# Patient Record
Sex: FEMALE | Race: WHITE | HISPANIC OR LATINO | Employment: FULL TIME | URBAN - METROPOLITAN AREA
[De-identification: names, ages, dates, MRNs, and addresses within clinical notes are randomized per-mention and may not be internally consistent; named-entity substitution may affect disease eponyms.]

---

## 2023-05-10 ENCOUNTER — TRANSFERRED RECORDS (OUTPATIENT)
Dept: HEALTH INFORMATION MANAGEMENT | Facility: CLINIC | Age: 23
End: 2023-05-10

## 2024-08-05 ENCOUNTER — TRANSFERRED RECORDS (OUTPATIENT)
Dept: HEALTH INFORMATION MANAGEMENT | Facility: CLINIC | Age: 24
End: 2024-08-05
Payer: COMMERCIAL

## 2024-08-14 ENCOUNTER — OFFICE VISIT (OUTPATIENT)
Dept: FAMILY MEDICINE | Facility: CLINIC | Age: 24
End: 2024-08-14
Payer: COMMERCIAL

## 2024-08-14 VITALS
TEMPERATURE: 98.1 F | SYSTOLIC BLOOD PRESSURE: 118 MMHG | HEART RATE: 73 BPM | RESPIRATION RATE: 18 BRPM | BODY MASS INDEX: 26.62 KG/M2 | DIASTOLIC BLOOD PRESSURE: 78 MMHG | HEIGHT: 61 IN | OXYGEN SATURATION: 97 % | WEIGHT: 141 LBS

## 2024-08-14 DIAGNOSIS — Z76.89 ENCOUNTER TO ESTABLISH CARE: Primary | ICD-10-CM

## 2024-08-14 DIAGNOSIS — Z30.09 ENCOUNTER FOR OTHER GENERAL COUNSELING OR ADVICE ON CONTRACEPTION: ICD-10-CM

## 2024-08-14 PROCEDURE — 99203 OFFICE O/P NEW LOW 30 MIN: CPT | Mod: GC

## 2024-08-14 NOTE — PATIENT INSTRUCTIONS
Great to meet you today! We will request records from your prior clinic. We will plan to schedule you for a follow up procedure visit to have the Nexplanon (implant) placed.     Please let us know if you have any questions or concerns.

## 2024-08-14 NOTE — PROGRESS NOTES
I have personally reviewed the history and examination as documented by Dr. Arredondo.  I was present during key portions of the visit and agree with the assessment and plan as documented for 23 yr old female here for contraceptive mgmt. Will have her return for nexplanon insertion. Precautions given. Anticipatory guidance given.     Bhanu Van MD  August 14, 2024  3:35 PM

## 2024-08-14 NOTE — PROGRESS NOTES
"  Assessment & Plan     Encounter to establish care  Patient here today to establish care at our clinic. Was previously seen at [facility]. NEW filled out today for records. No acute concerns, wanting to discuss contraceptive options and interested in placement of Nexplanon. Reviewed past medical history. Will await records from outside clinic. Plan to schedule for procedure follow up for Nexplanon placement.       Encounter for other general counseling or advice on contraception  Patient here today to discuss contraceptive options. Had previously had Nexplanon implant, which she liked and is interested in again. Last had it removed one year ago, because at that time, patient reports \"it was placed wrong\" and she was having a lot of arm pain associated with the implant. Recently had injection of Depo (8/5/24) at outside clinic, but here today because she is wanting the implant again. Discussed risk/benefits of Nexplanon. Discussed the steps to the procedure. Because patient recently had the Depo injection, will plan to schedule follow up procedure visit in ~2 months.     BMI  Estimated body mass index is 26.62 kg/m  as calculated from the following:    Height as of this encounter: 1.55 m (5' 1.02\").    Weight as of this encounter: 64 kg (141 lb).     Return in about 2 months (around 10/14/2024) for procedure - nexplanon.    Teresita Giordano is a 23 year old, presenting for the following health issues:  Establish Care and Contraception (Wants Birth control implant )        8/14/2024     3:09 PM   Additional Questions   Roomed by Bakari   Accompanied by self         8/14/2024    Information    services provided? Yes   Language Armenian   Type of interpretation provided Face-to-face    name [facility]    Agency [employer]        HPI     23 year old here today to establish care    Seen previously at [facility]. No concerns today.     Interested in nexplanon for birth " "control.  Had depo injection last Monday, but looking for LARC option.   Had nexplanon in the past, and wants this again   No interested in children for the next 3 years. Currently sexually active.   In monogomous relationship. No concerns with partner.     No reported PMHx  No Surgical history  Not on any medication    Contraception  - interested in implant  - not interested in children now  - had nexplanon in the past - had it removed about one year ago - states it was placed 'wrong' and she had a lot of arm pain   - currently in relationship, currently sexually active, using Depo  - last depo - last Monday 8/5/24  - LMP 7/25/24     Review of Systems  Constitutional, neuro, ENT, endocrine, pulmonary, cardiac, gastrointestinal, genitourinary, musculoskeletal, integument and psychiatric systems are negative, except as otherwise noted.      Objective    /78 (BP Location: Right arm, Patient Position: Sitting)   Pulse 73   Temp 98.1  F (36.7  C) (Oral)   Resp 18   Ht 1.55 m (5' 1.02\")   Wt 64 kg (141 lb)   LMP 07/25/2024 (Exact Date)   SpO2 97%   BMI 26.62 kg/m    Body mass index is 26.62 kg/m .  Physical Exam   GENERAL: alert and no distress  NECK: no adenopathy, no asymmetry, masses, or scars  RESP: lungs clear to auscultation - no rales, rhonchi or wheezes  CV: regular rate and rhythm  ABDOMEN: soft, nontender  MS: no gross musculoskeletal defects noted, no edema          Signed Electronically by: Amalia Arredondo, DO    "

## 2024-10-09 NOTE — PROGRESS NOTES
Assessment & Plan     Nexplanon insertion  Encounter for other contraceptive management  Last saw patient to establish care 8/14/24 at which time contraceptive options were discussed. Patient previously had Nexplanon and wanted to proceed with placement of this. Prior form of contraception, Depo.  Here today for Nexplanon insertion. See procedure note below.   - etonogestrel (NEXPLANON) subdermal implant 68 mg  - lidocaine 1% with EPINEPHrine 1:100,000 injection 3 mL    Procedure Note-Nexplanon Insertion    Giuliana Combs is a patient of Amalia Herrera here for placement of etonogestrel implant (Nexplanon)    Indication:Contraception    Consent: Affirmation of informed consent was signed and scanned into the medical record. Risks, benefits and alternatives were discussed. Patient's questions were elicited and answered.   Procedure safety checklist was completed:  Yes  Time Out (Pause for the Cause) completed: Yes    Labs: UPT:  prior Depo   LMP:   7/25/24   Previous Contraception:  Depo 8/14/24                Counseling:  Pt. counseled on potential side effects of  Nexplanon, including unpredictable spotting/bleeding and plan for removal/replacement of Nexplanon in 3 years or less.    Preoperative Diagnosis: Desires effective contraception  Postoperative Diagnosis: etonogestrel implant in place  Skin prep Betadine  Anesthesia 1% lidocaine, with epi    Technique:   Patient was placed supine with left arm exposed.  Marky was made 8 cm above medial epicondial and a guiding marky 4 cm above the first.  Arm was prepped with betadine. Insertion point was anesthetized with 1% lidocaine 2mL. While stretching the skin around the insertion site,,skin punctured with the tip of the needle inserted at 30 degrees and then lowered to horizontal position. While lifting the skin with the tip of the needle, the needle was advanced to it's full length. Applicator was then stabilized and the purple slider unlocked and  "moved back completely until it stopped. Applicator was then removed.  Correct placement of the implant was confirmed by palpating it in patient's arm and visualizing the purple top of the obturator.  Insertion site was dressed with a bandaid and covered by a pressure dressing.      User card and patient chart label filled out. User card  given to patient for record. Nexplanon added to medication list     Lot# [[id number]]    EBL: minimal  Complications:  No  Tolerance:  Pt tolerated procedure well and was in stable condition.     Follow up: Pt was instructed to call if bleeding, severe pain or foul smell.     Resident: Amalia Arredondo DO  Faculty: Dr. Kolby Guajardo MD; present for and supervised this entire procedure.      BMI  Estimated body mass index is 28.69 kg/m  as calculated from the following:    Height as of this encounter: 1.54 m (5' 0.63\").    Weight as of this encounter: 68 kg (150 lb).     No follow-ups on file.    Teresita Giordano is a 23 year old, presenting for the following health issues:  Nexplanon insertion  (No concern)        10/18/2024     3:30 PM   Additional Questions   Roomed by Tamika   Accompanied by          10/18/2024    Information    services provided? Yes   Language Hebrew   Type of interpretation provided Face-to-face    name Kwesi WALSH     23-year-old here today for Nexplanon placement.  Has had this placed in the past.  During prior visits, discussed treatment options for contraceptive management.  At that time, patient had recently received Depo injection.  Here today for placement of Nexplanon.      Review of Systems  Constitutional, neuro, ENT, endocrine, pulmonary, cardiac, gastrointestinal, genitourinary, musculoskeletal, integument and psychiatric systems are negative, except as otherwise noted.      Objective    /86   Pulse 80   Temp 98.2  F (36.8  C) (Oral)   Resp 14   Ht 1.54 m (5' 0.63\")   Wt 68 kg (150 lb)  "  SpO2 97%   BMI 28.69 kg/m    Body mass index is 28.69 kg/m .  Physical Exam   GENERAL: healthy, alert and no distress  RESP: speaking in full sentences, normal work of breathing   CV: extremities well perfused  ABDOMEN: soft, nontender  MS: no gross musculoskeletal defects noted, no edema  PSYCH: mentation appears normal, affect normal/bright          Signed Electronically by: Amalia Arredondo, DO

## 2024-10-18 ENCOUNTER — OFFICE VISIT (OUTPATIENT)
Dept: FAMILY MEDICINE | Facility: CLINIC | Age: 24
End: 2024-10-18
Payer: COMMERCIAL

## 2024-10-18 VITALS
HEIGHT: 61 IN | SYSTOLIC BLOOD PRESSURE: 126 MMHG | BODY MASS INDEX: 28.32 KG/M2 | HEART RATE: 80 BPM | OXYGEN SATURATION: 97 % | TEMPERATURE: 98.2 F | RESPIRATION RATE: 14 BRPM | WEIGHT: 150 LBS | DIASTOLIC BLOOD PRESSURE: 86 MMHG

## 2024-10-18 DIAGNOSIS — Z30.8 ENCOUNTER FOR OTHER CONTRACEPTIVE MANAGEMENT: ICD-10-CM

## 2024-10-18 DIAGNOSIS — Z30.017 NEXPLANON INSERTION: Primary | ICD-10-CM

## 2024-10-18 DIAGNOSIS — Z97.5 NEXPLANON IN PLACE: ICD-10-CM

## 2024-10-18 PROCEDURE — 11981 INSERTION DRUG DLVR IMPLANT: CPT | Mod: GC

## 2024-10-18 RX ORDER — LIDOCAINE HYDROCHLORIDE AND EPINEPHRINE 10; 10 MG/ML; UG/ML
3 INJECTION, SOLUTION INFILTRATION; PERINEURAL ONCE
Status: COMPLETED | OUTPATIENT
Start: 2024-10-18 | End: 2024-10-18

## 2024-10-18 RX ADMIN — LIDOCAINE HYDROCHLORIDE AND EPINEPHRINE 3 ML: 10; 10 INJECTION, SOLUTION INFILTRATION; PERINEURAL at 16:55

## 2024-10-20 NOTE — PROGRESS NOTES
Preceptor Attestation:   Patient seen, evaluated and discussed with the resident. I was present for and supervised the entire procedure. I have verified the content of the note, which accurately reflects my assessment of the patient and the plan of care.  Supervising Physician:Kolby Guajardo MD  [facility]

## 2025-05-09 ENCOUNTER — APPOINTMENT (OUTPATIENT)
Dept: INTERPRETER SERVICES | Facility: CLINIC | Age: 25
End: 2025-05-09
Payer: COMMERCIAL

## 2025-05-15 ENCOUNTER — HOSPITAL ENCOUNTER (OUTPATIENT)
Dept: ULTRASOUND IMAGING | Facility: HOSPITAL | Age: 25
End: 2025-05-15
Attending: ADVANCED PRACTICE MIDWIFE
Payer: COMMERCIAL

## 2025-05-15 DIAGNOSIS — Z34.01 PRIMIGRAVIDA, FIRST TRIMESTER: ICD-10-CM

## 2025-05-15 DIAGNOSIS — Z34.02 FIRST PREGNANCY, SECOND TRIMESTER: ICD-10-CM

## 2025-05-15 PROCEDURE — 76801 OB US < 14 WKS SINGLE FETUS: CPT

## 2025-05-15 PROCEDURE — T1013 SIGN LANG/ORAL INTERPRETER: HCPCS | Mod: U4

## 2025-05-15 NOTE — ADDENDUM NOTE
Encounter addended by: Isadora Correia, Registered Diagnostic Medical Sonographer on: 5/15/2025 4:45 PM   Actions taken: Imaging Exam ended